# Patient Record
Sex: MALE | Race: WHITE | NOT HISPANIC OR LATINO | Employment: FULL TIME | URBAN - METROPOLITAN AREA
[De-identification: names, ages, dates, MRNs, and addresses within clinical notes are randomized per-mention and may not be internally consistent; named-entity substitution may affect disease eponyms.]

---

## 2020-08-17 ENCOUNTER — OFFICE VISIT (OUTPATIENT)
Dept: ORTHOPEDIC SURGERY | Facility: CLINIC | Age: 45
End: 2020-08-17

## 2020-08-17 VITALS — BODY MASS INDEX: 29.66 KG/M2 | HEIGHT: 67 IN | WEIGHT: 189 LBS | TEMPERATURE: 98.7 F

## 2020-08-17 DIAGNOSIS — M25.521 RIGHT ELBOW PAIN: Primary | ICD-10-CM

## 2020-08-17 PROBLEM — K21.9 GERD (GASTROESOPHAGEAL REFLUX DISEASE): Status: ACTIVE | Noted: 2020-08-17

## 2020-08-17 PROBLEM — G47.30 SLEEP APNEA: Status: ACTIVE | Noted: 2018-05-22

## 2020-08-17 PROBLEM — Z78.9 ALCOHOL USE: Status: ACTIVE | Noted: 2018-05-22

## 2020-08-17 PROBLEM — M10.9 GOUT: Status: ACTIVE | Noted: 2018-05-22

## 2020-08-17 PROCEDURE — 99203 OFFICE O/P NEW LOW 30 MIN: CPT | Performed by: ORTHOPAEDIC SURGERY

## 2020-08-17 PROCEDURE — 73070 X-RAY EXAM OF ELBOW: CPT | Performed by: ORTHOPAEDIC SURGERY

## 2020-08-17 PROCEDURE — 20550 NJX 1 TENDON SHEATH/LIGAMENT: CPT | Performed by: ORTHOPAEDIC SURGERY

## 2020-08-17 RX ORDER — METHYLPREDNISOLONE ACETATE 80 MG/ML
80 INJECTION, SUSPENSION INTRA-ARTICULAR; INTRALESIONAL; INTRAMUSCULAR; SOFT TISSUE
Status: COMPLETED | OUTPATIENT
Start: 2020-08-17 | End: 2020-08-17

## 2020-08-17 RX ORDER — ALLOPURINOL 100 MG/1
100 TABLET ORAL DAILY
COMMUNITY
Start: 2019-09-06

## 2020-08-17 RX ORDER — ICOSAPENT ETHYL 1000 MG/1
2 CAPSULE ORAL
COMMUNITY
Start: 2020-05-11

## 2020-08-17 RX ADMIN — METHYLPREDNISOLONE ACETATE 80 MG: 80 INJECTION, SUSPENSION INTRA-ARTICULAR; INTRALESIONAL; INTRAMUSCULAR; SOFT TISSUE at 08:56

## 2020-08-17 NOTE — PROGRESS NOTES
Harsh Rosales     : 1975     MRN: [id number]     DATE: 2020    Chief Complaints:  Right elbow pain    History of Present Illness:     44 y.o. male patient who presents for evaluation of the right elbow.  The patient reports that the symptoms began years ago and has been a persistent albeit intermittent issue.  Symptoms seem to have gotten worse here lately.  Pain is described as moderate, constant and aching.  He reports sharp pains intermittently associated with certain reaching and lifting movements.  He localizes his pain to both sides of the elbow.  The medial side is a little worse than the lateral side.  He also gets occasional numbness and tingling down into his hand.  He has a tennis elbow strap that he wears.  This does seem to help somewhat.  Rest and anti-inflammatories do help somewhat.    Allergies: No Known Allergies     Home Medications:    Current Outpatient Medications:   •  allopurinol (ZYLOPRIM) 100 MG tablet, Take 100 mg by mouth Daily., Disp: , Rfl:   •  icosapent ethyl (VASCEPA) 1 g capsule capsule, Take 2 g by mouth., Disp: , Rfl:   •  Multiple Vitamin (MULTI-VITAMIN) tablet, Take 1 tablet by mouth Daily., Disp: , Rfl:   Past Medical History:   Diagnosis Date   • High cholesterol    • History of umbilical hernia      Past Surgical History:   Procedure Laterality Date   • UMBILICAL HERNIA REPAIR  2018     Social History     Occupational History   • Not on file   Tobacco Use   • Smoking status: Never Smoker   Substance and Sexual Activity   • Alcohol use: Yes   • Drug use: Not on file   • Sexual activity: Not on file      Social History     Social History Narrative   • Not on file     Family History   Problem Relation Age of Onset   • No Known Problems Mother        Review of Systems:      Constitutional: Denies fever, shaking or chills   Eyes: Denies change in visual acuity   HEENT: Denies nasal congestion or sore throat   Respiratory: Denies cough or shortness of breath  "  Cardiovascular: Denies chest pain or edema  Endocrine: Denies tremors, palpitations, intolerance of heat or cold, polyuria, polydipsia.  GI: Denies abdominal pain, nausea, vomiting, bloody stools or diarrhea  : Denies frequency, urgency, incontinence, retention, or nocturia.  Musculoskeletal: Denies numbness, tingling or loss of motor function except as above  Integument: Denies rash, lesion or ulceration   Neurologic: Denies headache or focal weakness, deficits  Heme: Denies spontaneous or excessive bleeding, epistaxis, hematuria, melena, fatigue, enlarged or tender lymph nodes.      All other pertinent positives and negatives as noted above in HPI.    Physical Exam: 44 y.o. male    Vitals:    08/17/20 0834   Temp: 98.7 °F (37.1 °C)   TempSrc: Temporal   Weight: 85.7 kg (189 lb)   Height: 170.2 cm (67\")     General:  Patient is awake and alert.  Appears in no acute distress or discomfort.    Psych:  Affect and demeanor are appropriate.    Eyes:  Conjunctiva and sclera appear grossly normal.  Eyes track well and EOM seem to be intact.    Ears:  No gross abnormalities.  Hearing adequate for the exam.    Cardiovascular:  Regular rate and rhythm.    Lungs:  Good chest expansion.  Breathing unlabored.    Extremities:  Right elbow skin appears benign.  No obvious lesions, swellings, masses or adenopathy.  Focal tenderness noted over the medial epicondyle.  I would characterize this tenderness as moderate.  Mild tenderness along the ulnar nerve and cubital tunnel as well.  He has mild tenderness over the lateral side at the common extensor origin.  No tenderness over the remainder of the elbow.  Full elbow motion.  No instability.  Good strength with elbow flexion, extension, supination, pronation.  Pronation and resisted wrist flexion are moderately uncomfortable.  Positive cheek press test.  He has more mild lateral pain with resisted wrist extension.  Good strength in the hand with  and pinch.  Sensation is " intact.  Negative Tinel's over the cubital tunnel.  Negative Tinel's and Phalen's over the carpal tunnel.  Palpable radial pulse with good skin turgor and brisk capillary refill.    DIAGNOSTIC STUDIES    Xrays:  AP and lateral views of the right elbow are ordered by myself and reviewed to evaluate the patient's complaint.  No comparison films are immediately available.  The x-rays show no obvious acute abnormalities, lesions, masses, significant degenerative changes, or other concerning findings.    ASSESSMENT:  1.  Right elbow medial epicondylitis, mild ulnar neuritis   2.  Mild lateral epicondylitis    PLAN: I think most of his symptoms are from golfers elbow and he is getting some irritation of the ulnar nerve as well.  He also seems to have mild tennis elbow.  We discussed options in detail, both surgical and non-surgical.  I have recommended that we start with a conservative approach.  We discussed all available conservative treatment options including activity modifications, bracing, anti-inflammatories, physical therapy, and injections.  I explained the likely short-term benefits of cortisone injection and the associated risks.  We also talked about the available evidence on PRP.  I explained that this data for medial epicondylitis is largely extrapolated from the data on lateral epicondylitis.  The patient acknowledged understanding of this information.  He has elected for a cortisone injection for the medial side.  The risk, benefits and alternatives were discussed.  He consented and the injection was performed as described below.  I told him to call me in 2 weeks if no better.  We may have to consider injecting his lateral side down the road if those symptoms worsen.    Len Dudley MD    08/17/2020    CC to Provider, No Known    Medium Joint Arthrocentesis: R elbow  Date/Time: 8/17/2020 8:56 AM  Consent given by: patient  Site marked: site marked  Timeout: Immediately prior to procedure a time out  was called to verify the correct patient, procedure, equipment, support staff and site/side marked as required   Supporting Documentation  Indications: pain   Procedure Details  Location: elbow - R elbow  Needle size: 25 G  Approach: anterolateral  Medications administered: 2 mL lidocaine (cardiac); 80 mg methylPREDNISolone acetate 80 MG/ML  Patient tolerance: patient tolerated the procedure well with no immediate complications

## 2021-04-06 ENCOUNTER — BULK ORDERING (OUTPATIENT)
Dept: CASE MANAGEMENT | Facility: OTHER | Age: 46
End: 2021-04-06

## 2021-04-06 DIAGNOSIS — Z23 IMMUNIZATION DUE: ICD-10-CM
